# Patient Record
Sex: FEMALE | URBAN - METROPOLITAN AREA
[De-identification: names, ages, dates, MRNs, and addresses within clinical notes are randomized per-mention and may not be internally consistent; named-entity substitution may affect disease eponyms.]

---

## 2020-06-29 ENCOUNTER — OUT OF OFFICE VISIT (OUTPATIENT)
Dept: URBAN - METROPOLITAN AREA MEDICAL CENTER 23 | Facility: MEDICAL CENTER | Age: 65
End: 2020-06-29
Payer: MEDICARE

## 2020-06-29 DIAGNOSIS — K21.9 ACID REFLUX: ICD-10-CM

## 2020-06-29 DIAGNOSIS — K29.40 ATROPHIC GASTRITIS: ICD-10-CM

## 2020-06-29 DIAGNOSIS — K92.1 BLACK STOOL: ICD-10-CM

## 2020-06-29 DIAGNOSIS — I71.4 AAA (ABDOMINAL AORTIC ANEURYSM): ICD-10-CM

## 2020-06-29 DIAGNOSIS — N17.8 OTHER ACUTE KIDNEY FAILURE: ICD-10-CM

## 2020-06-29 PROCEDURE — 43239 EGD BIOPSY SINGLE/MULTIPLE: CPT | Performed by: INTERNAL MEDICINE

## 2020-06-29 PROCEDURE — G8427 DOCREV CUR MEDS BY ELIG CLIN: HCPCS | Performed by: INTERNAL MEDICINE

## 2020-06-29 PROCEDURE — 99223 1ST HOSP IP/OBS HIGH 75: CPT | Performed by: INTERNAL MEDICINE

## 2020-08-10 ENCOUNTER — OFFICE VISIT (OUTPATIENT)
Dept: URBAN - METROPOLITAN AREA CLINIC 54 | Facility: CLINIC | Age: 65
End: 2020-08-10

## 2020-09-11 ENCOUNTER — OFFICE VISIT (OUTPATIENT)
Dept: URBAN - METROPOLITAN AREA CLINIC 54 | Facility: CLINIC | Age: 65
End: 2020-09-11
Payer: MEDICARE

## 2020-09-11 ENCOUNTER — DASHBOARD ENCOUNTERS (OUTPATIENT)
Age: 65
End: 2020-09-11

## 2020-09-11 DIAGNOSIS — R10.32 LLQ ABDOMINAL PAIN: ICD-10-CM

## 2020-09-11 DIAGNOSIS — K26.9 DUODENAL ULCER: ICD-10-CM

## 2020-09-11 PROCEDURE — 3017F COLORECTAL CA SCREEN DOC REV: CPT | Performed by: INTERNAL MEDICINE

## 2020-09-11 PROCEDURE — G8427 DOCREV CUR MEDS BY ELIG CLIN: HCPCS | Performed by: INTERNAL MEDICINE

## 2020-09-11 PROCEDURE — G9902 PT SCRN TBCO AND ID AS USER: HCPCS | Performed by: INTERNAL MEDICINE

## 2020-09-11 PROCEDURE — 99202 OFFICE O/P NEW SF 15 MIN: CPT | Performed by: INTERNAL MEDICINE

## 2020-09-11 PROCEDURE — G8420 CALC BMI NORM PARAMETERS: HCPCS | Performed by: INTERNAL MEDICINE

## 2020-09-11 RX ORDER — LISINOPRIL AND HYDROCHLOROTHIAZIDE 12.5; 1 MG/1; MG/1
TABLET ORAL
Qty: 90 UNSPECIFIED | Status: ACTIVE | COMMUNITY

## 2020-09-11 RX ORDER — IPRATROPIUM BROMIDE AND ALBUTEROL 20; 100 UG/1; UG/1
SPRAY, METERED RESPIRATORY (INHALATION)
Qty: 16 UNSPECIFIED | Status: ACTIVE | COMMUNITY

## 2020-09-11 RX ORDER — PANTOPRAZOLE SODIUM 40 MG/1
TABLET, DELAYED RELEASE ORAL
Qty: 30 UNSPECIFIED | Status: ACTIVE | COMMUNITY

## 2020-09-11 RX ORDER — FLUTICASONE PROPIONATE AND SALMETEROL XINAFOATE 230; 21 UG/1; UG/1
AEROSOL, METERED RESPIRATORY (INHALATION)
Qty: 12 UNSPECIFIED | Status: ACTIVE | COMMUNITY

## 2020-09-11 RX ORDER — ROSUVASTATIN CALCIUM 20 MG/1
TABLET, FILM COATED ORAL
Qty: 30 UNSPECIFIED | Status: ACTIVE | COMMUNITY

## 2020-09-11 RX ORDER — LISINOPRIL 10 MG/1
TABLET ORAL
Qty: 30 UNSPECIFIED | Status: ACTIVE | COMMUNITY

## 2020-09-11 RX ORDER — BUPROPION HYDROCHLORIDE 150 MG/1
TABLET, EXTENDED RELEASE ORAL
Qty: 60 UNSPECIFIED | Status: ACTIVE | COMMUNITY

## 2020-10-19 ENCOUNTER — OFFICE VISIT (OUTPATIENT)
Dept: URBAN - METROPOLITAN AREA SURGERY CENTER 14 | Facility: SURGERY CENTER | Age: 65
End: 2020-10-19
Payer: MEDICARE

## 2020-10-19 ENCOUNTER — CLAIMS CREATED FROM THE CLAIM WINDOW (OUTPATIENT)
Dept: URBAN - METROPOLITAN AREA CLINIC 4 | Facility: CLINIC | Age: 65
End: 2020-10-19
Payer: MEDICARE

## 2020-10-19 DIAGNOSIS — K63.89 OTHER SPECIFIED DISEASES OF INTESTINE: ICD-10-CM

## 2020-10-19 DIAGNOSIS — K63.5 BENIGN COLON POLYP: ICD-10-CM

## 2020-10-19 DIAGNOSIS — R10.32 ABDOMINAL PAIN, LLQ: ICD-10-CM

## 2020-10-19 DIAGNOSIS — K59.09 CHRONIC CONSTIPATION: ICD-10-CM

## 2020-10-19 PROCEDURE — 45385 COLONOSCOPY W/LESION REMOVAL: CPT | Performed by: INTERNAL MEDICINE

## 2020-10-19 PROCEDURE — G8907 PT DOC NO EVENTS ON DISCHARG: HCPCS | Performed by: INTERNAL MEDICINE

## 2020-10-19 PROCEDURE — 88305 TISSUE EXAM BY PATHOLOGIST: CPT | Performed by: PATHOLOGY

## 2020-10-19 PROCEDURE — G9937 DIG OR SURV COLSCO: HCPCS | Performed by: INTERNAL MEDICINE
